# Patient Record
Sex: FEMALE | Race: WHITE | HISPANIC OR LATINO | URBAN - NONMETROPOLITAN AREA
[De-identification: names, ages, dates, MRNs, and addresses within clinical notes are randomized per-mention and may not be internally consistent; named-entity substitution may affect disease eponyms.]

---

## 2017-03-03 ENCOUNTER — FOLLOW UP ESTABLISHED (OUTPATIENT)
Dept: URBAN - NONMETROPOLITAN AREA CLINIC 6 | Facility: CLINIC | Age: 57
End: 2017-03-03
Payer: COMMERCIAL

## 2017-03-03 PROCEDURE — 92014 COMPRE OPH EXAM EST PT 1/>: CPT | Performed by: OPTOMETRIST

## 2017-03-03 PROCEDURE — 92015 DETERMINE REFRACTIVE STATE: CPT | Performed by: OPTOMETRIST

## 2017-03-03 ASSESSMENT — INTRAOCULAR PRESSURE
OS: 20
OD: 21

## 2017-03-03 ASSESSMENT — VISUAL ACUITY
OS: 20/20
OD: 20/20

## 2018-04-02 ENCOUNTER — FOLLOW UP ESTABLISHED (OUTPATIENT)
Dept: URBAN - NONMETROPOLITAN AREA CLINIC 6 | Facility: CLINIC | Age: 58
End: 2018-04-02
Payer: COMMERCIAL

## 2018-04-02 DIAGNOSIS — H52.13 MYOPIA, BILATERAL: Primary | ICD-10-CM

## 2018-04-02 PROCEDURE — 92015 DETERMINE REFRACTIVE STATE: CPT | Performed by: OPTOMETRIST

## 2018-04-02 PROCEDURE — 92014 COMPRE OPH EXAM EST PT 1/>: CPT | Performed by: OPTOMETRIST

## 2018-04-02 ASSESSMENT — VISUAL ACUITY
OS: 20/20
OD: 20/20

## 2018-04-02 ASSESSMENT — INTRAOCULAR PRESSURE
OD: 17
OS: 18

## 2019-10-21 ENCOUNTER — FOLLOW UP ESTABLISHED (OUTPATIENT)
Dept: URBAN - NONMETROPOLITAN AREA CLINIC 6 | Facility: CLINIC | Age: 59
End: 2019-10-21
Payer: COMMERCIAL

## 2019-10-21 DIAGNOSIS — H25.013 CORTICAL AGE-RELATED CATARACT, BILATERAL: ICD-10-CM

## 2019-10-21 PROCEDURE — 92015 DETERMINE REFRACTIVE STATE: CPT | Performed by: OPTOMETRIST

## 2019-10-21 PROCEDURE — 92014 COMPRE OPH EXAM EST PT 1/>: CPT | Performed by: OPTOMETRIST

## 2019-10-21 ASSESSMENT — INTRAOCULAR PRESSURE
OS: 16
OD: 16

## 2019-10-21 ASSESSMENT — KERATOMETRY
OD: 44.78
OS: 44.22

## 2019-10-21 ASSESSMENT — VISUAL ACUITY
OS: 20/20
OD: 20/20

## 2021-05-14 ENCOUNTER — OFFICE VISIT (OUTPATIENT)
Dept: URBAN - NONMETROPOLITAN AREA CLINIC 6 | Facility: CLINIC | Age: 61
End: 2021-05-14
Payer: COMMERCIAL

## 2021-05-14 DIAGNOSIS — H25.13 AGE-RELATED NUCLEAR CATARACT, BILATERAL: ICD-10-CM

## 2021-05-14 DIAGNOSIS — E11.9 TYPE 2 DIABETES MELLITUS W/O COMPLICATION: ICD-10-CM

## 2021-05-14 PROCEDURE — 92014 COMPRE OPH EXAM EST PT 1/>: CPT | Performed by: OPTOMETRIST

## 2021-05-14 ASSESSMENT — VISUAL ACUITY
OD: 20/20
OS: 20/20

## 2021-05-14 ASSESSMENT — INTRAOCULAR PRESSURE
OS: 14
OD: 14

## 2021-05-14 NOTE — IMPRESSION/PLAN
Impression: Type 2 diabetes mellitus w/o complication: N76.8. Plan: Diabetes non-insulin: no non-proliferative diabetic retinopathy, no signs of neovascularization noted. Discussed ocular and systemic benefits of blood sugar control. Continue management under PCP.

## 2023-01-25 ENCOUNTER — OFFICE VISIT (OUTPATIENT)
Dept: URBAN - NONMETROPOLITAN AREA CLINIC 6 | Facility: CLINIC | Age: 63
End: 2023-01-25
Payer: COMMERCIAL

## 2023-01-25 DIAGNOSIS — E11.9 TYPE 2 DIABETES MELLITUS W/O COMPLICATION: ICD-10-CM

## 2023-01-25 DIAGNOSIS — Z79.84 LONG TERM (CURRENT) USE OF ORAL ANTIDIABETIC DRUGS: ICD-10-CM

## 2023-01-25 DIAGNOSIS — H52.13 MYOPIA, BILATERAL: Primary | ICD-10-CM

## 2023-01-25 DIAGNOSIS — H25.813 COMBINED FORMS OF AGE-RELATED CATARACT, BILATERAL: ICD-10-CM

## 2023-01-25 PROCEDURE — 92014 COMPRE OPH EXAM EST PT 1/>: CPT | Performed by: OPTOMETRIST

## 2023-01-25 ASSESSMENT — INTRAOCULAR PRESSURE
OS: 15
OD: 15

## 2023-01-25 ASSESSMENT — VISUAL ACUITY
OS: 20/20
OD: 20/20

## 2023-01-25 NOTE — IMPRESSION/PLAN
Impression: Myopia, bilateral: H52.13. Plan: Discussed adjustment period. Glasses Rx dispensed to patient today. Return to clinic if any changes occur.

## 2023-01-25 NOTE — IMPRESSION/PLAN
Impression: Combined forms of age-related cataract, bilateral: H25.813. Plan: Cataracts account for the patient's complaints. Treatment is not recommended at this time. The patient will monitor vision changes and contact us with any decrease in vision.

## 2023-01-25 NOTE — IMPRESSION/PLAN
Impression: Type 2 diabetes mellitus w/o complication: M66.6. Plan: Diabetes non-insulin: no non-proliferative diabetic retinopathy, no signs of neovascularization noted. Discussed ocular and systemic benefits of blood sugar control. Continue management under PCP.

## 2023-04-11 ENCOUNTER — PROCEDURE (OUTPATIENT)
Dept: URBAN - NONMETROPOLITAN AREA CLINIC 6 | Facility: CLINIC | Age: 63
End: 2023-04-11

## 2023-04-11 DIAGNOSIS — H52.13 MYOPIA, BILATERAL: Primary | ICD-10-CM

## 2023-04-11 PROCEDURE — 92310 CONTACT LENS FITTING OU: CPT | Performed by: OPTOMETRIST

## 2023-04-11 ASSESSMENT — VISUAL ACUITY
OD: 20/20
OS: 20/20

## 2023-04-11 NOTE — IMPRESSION/PLAN
Impression: Myopia, bilateral: H52.13. Plan: Order Cl trials and schedule a follow up/refresher instruction same day as dispense. The patient previously wore GP lenses. They were uncomfortable to her. Will try soft lenses. Biofinity Toric. Consider [facility] if Biofinity is not working well.

## 2023-04-19 ENCOUNTER — OFFICE VISIT (OUTPATIENT)
Dept: URBAN - NONMETROPOLITAN AREA CLINIC 6 | Facility: CLINIC | Age: 63
End: 2023-04-19

## 2023-04-19 DIAGNOSIS — H52.13 MYOPIA, BILATERAL: Primary | ICD-10-CM

## 2023-04-19 PROCEDURE — 92310 CONTACT LENS FITTING OU: CPT | Performed by: OPTOMETRIST

## 2023-04-26 ENCOUNTER — PROCEDURE (OUTPATIENT)
Dept: URBAN - NONMETROPOLITAN AREA CLINIC 6 | Facility: CLINIC | Age: 63
End: 2023-04-26

## 2023-04-26 DIAGNOSIS — H52.13 MYOPIA, BILATERAL: Primary | ICD-10-CM

## 2023-04-26 PROCEDURE — 92310 CONTACT LENS FITTING OU: CPT | Performed by: OPTOMETRIST
